# Patient Record
Sex: MALE | Race: WHITE | Employment: OTHER | URBAN - METROPOLITAN AREA
[De-identification: names, ages, dates, MRNs, and addresses within clinical notes are randomized per-mention and may not be internally consistent; named-entity substitution may affect disease eponyms.]

---

## 2021-03-31 ENCOUNTER — TRANSCRIBE ORDER (OUTPATIENT)
Dept: SCHEDULING | Age: 66
End: 2021-03-31

## 2021-03-31 DIAGNOSIS — R10.32 CHRONIC LLQ PAIN: Primary | ICD-10-CM

## 2021-03-31 DIAGNOSIS — G89.29 CHRONIC LLQ PAIN: Primary | ICD-10-CM

## 2021-04-07 ENCOUNTER — TRANSCRIBE ORDER (OUTPATIENT)
Dept: SCHEDULING | Age: 66
End: 2021-04-07

## 2021-04-07 DIAGNOSIS — R20.2 PARESTHESIA OF LEFT LEG: Primary | ICD-10-CM

## 2021-04-12 ENCOUNTER — HOSPITAL ENCOUNTER (OUTPATIENT)
Dept: VASCULAR SURGERY | Age: 66
Discharge: HOME | End: 2021-04-12
Attending: FAMILY MEDICINE
Payer: COMMERCIAL

## 2021-04-12 ENCOUNTER — HOSPITAL ENCOUNTER (OUTPATIENT)
Dept: CT IMAGING | Age: 66
Discharge: HOME | End: 2021-04-12
Attending: INTERNAL MEDICINE
Payer: COMMERCIAL

## 2021-04-12 DIAGNOSIS — R20.2 PARESTHESIA OF LEFT LEG: ICD-10-CM

## 2021-04-12 DIAGNOSIS — G89.29 CHRONIC LLQ PAIN: ICD-10-CM

## 2021-04-12 DIAGNOSIS — R10.32 CHRONIC LLQ PAIN: ICD-10-CM

## 2021-04-12 PROCEDURE — 74177 CT ABD & PELVIS W/CONTRAST: CPT

## 2021-04-12 PROCEDURE — 74011000250 HC RX REV CODE- 250: Performed by: INTERNAL MEDICINE

## 2021-04-12 PROCEDURE — 93922 UPR/L XTREMITY ART 2 LEVELS: CPT

## 2021-04-12 PROCEDURE — 74011000636 HC RX REV CODE- 636: Performed by: INTERNAL MEDICINE

## 2021-04-12 RX ORDER — BARIUM SULFATE 20 MG/ML
900 SUSPENSION ORAL
Status: COMPLETED | OUTPATIENT
Start: 2021-04-12 | End: 2021-04-12

## 2021-04-12 RX ADMIN — BARIUM SULFATE 900 ML: 21 SUSPENSION ORAL at 06:43

## 2021-04-12 RX ADMIN — IOPAMIDOL 100 ML: 755 INJECTION, SOLUTION INTRAVENOUS at 06:43

## 2021-04-13 LAB
LEFT ABI: 1.2
LEFT ANTERIOR TIBIAL: 175 MMHG
LEFT ARM BP: 143 MMHG
LEFT ATA BP LEVEL: NORMAL
LEFT POSTERIOR TIBIAL: 177 MMHG
LEFT TBI: 0.82
LEFT TOE PRESSURE: 122 MMHG
RIGHT ABI: 1.22
RIGHT ANTERIOR TIBIAL: 171 MMHG
RIGHT ARM BP: 148 MMHG
RIGHT ATA BP LEVEL: NORMAL
RIGHT POSTERIOR TIBIAL: 181 MMHG
RIGHT TBI: 0.64
RIGHT TOE PRESSURE: 95 MMHG

## 2021-09-07 ENCOUNTER — TRANSCRIBE ORDER (OUTPATIENT)
Dept: SCHEDULING | Age: 66
End: 2021-09-07

## 2021-09-07 DIAGNOSIS — M51.36 DDD (DEGENERATIVE DISC DISEASE), LUMBAR: ICD-10-CM

## 2021-09-07 DIAGNOSIS — M54.16 LUMBAR RADICULOPATHY: ICD-10-CM

## 2021-09-07 DIAGNOSIS — M47.817 LUMBOSACRAL SPONDYLOSIS WITHOUT MYELOPATHY: Primary | ICD-10-CM

## 2021-09-07 DIAGNOSIS — M21.372 LEFT FOOT DROP: ICD-10-CM

## 2021-09-07 DIAGNOSIS — M54.32 LEFT SIDED SCIATICA: ICD-10-CM

## 2021-09-07 DIAGNOSIS — M96.1 POST LAMINECTOMY SYNDROME: ICD-10-CM

## 2021-09-08 ENCOUNTER — OFFICE VISIT (OUTPATIENT)
Dept: NEUROLOGY | Age: 66
End: 2021-09-08
Payer: COMMERCIAL

## 2021-09-08 VITALS
DIASTOLIC BLOOD PRESSURE: 80 MMHG | HEART RATE: 67 BPM | OXYGEN SATURATION: 98 % | TEMPERATURE: 98.5 F | WEIGHT: 171.8 LBS | HEIGHT: 66 IN | SYSTOLIC BLOOD PRESSURE: 147 MMHG | RESPIRATION RATE: 17 BRPM | BODY MASS INDEX: 27.61 KG/M2

## 2021-09-08 DIAGNOSIS — G31.84 MCI (MILD COGNITIVE IMPAIRMENT) WITH MEMORY LOSS: Primary | ICD-10-CM

## 2021-09-08 DIAGNOSIS — Z87.820 PERSONAL HISTORY OF MULTIPLE CONCUSSIONS: ICD-10-CM

## 2021-09-08 DIAGNOSIS — E53.8 VITAMIN B12 DEFICIENCY: ICD-10-CM

## 2021-09-08 DIAGNOSIS — R41.3 MEMORY LOSS: ICD-10-CM

## 2021-09-08 PROBLEM — M51.36 LUMBAR DEGENERATIVE DISC DISEASE: Status: ACTIVE | Noted: 2021-09-08

## 2021-09-08 PROBLEM — M47.27 OSTEOARTHRITIS OF SPINE WITH RADICULOPATHY, LUMBOSACRAL REGION: Status: ACTIVE | Noted: 2021-09-08

## 2021-09-08 PROBLEM — M96.1 POSTLAMINECTOMY SYNDROME OF LUMBAR REGION: Status: ACTIVE | Noted: 2021-09-08

## 2021-09-08 PROBLEM — M21.372 ACQUIRED LEFT FOOT DROP: Status: ACTIVE | Noted: 2021-09-08

## 2021-09-08 LAB — TSH SERPL DL<=0.05 MIU/L-ACNC: 1.55 UIU/ML (ref 0.36–3.74)

## 2021-09-08 PROCEDURE — 99205 OFFICE O/P NEW HI 60 MIN: CPT | Performed by: PSYCHIATRY & NEUROLOGY

## 2021-09-08 RX ORDER — DONEPEZIL HYDROCHLORIDE 5 MG/1
5 TABLET, FILM COATED ORAL
Qty: 30 TABLET | Refills: 0 | Status: SHIPPED | OUTPATIENT
Start: 2021-09-08 | End: 2021-09-24 | Stop reason: DRUGHIGH

## 2021-09-08 NOTE — PROGRESS NOTES
Identified pt with two pt identifiers(name and ). Reviewed record in preparation for visit and have obtained necessary documentation. Chief Complaint   Patient presents with    New Patient   [facility] Establish Care   Patient referred by primary care- patient has had memory issues for about 3 years, worse in the last year. Patient reported spin surgery twice. Health Maintenance Due   Topic    Hepatitis C Screening     DTaP/Tdap/Td series (1 - Tdap)    Lipid Screen     Colorectal Cancer Screening Combo     Shingrix Vaccine Age 50> (1 of 2)    Pneumococcal 65+ years (1 of 1 - PPSV23)    Flu Vaccine (1)        Visit Vitals  BP (!) 147/80 (BP 1 Location: Left upper arm, BP Patient Position: Sitting)   Pulse 67   Temp 98.5 °F (36.9 °C) (Oral)   Resp 17   Ht 5' 5.5\" (1.664 m)   Wt 171 lb 12.8 oz (77.9 kg)   SpO2 98%   BMI 28.15 kg/m²     Pain Scale: /10    Coordination of Care Questionnaire:  :   1. Have you been to the ER, urgent care clinic since your last visit? Hospitalized since your last visit? No    2. Have you seen or consulted any other health care providers outside of the [address] [place] [address] since your last visit? Include any pap smears or colon screening.  No'

## 2021-09-08 NOTE — PATIENT INSTRUCTIONS
As per discussion    Your diagnosis is early onset early stage dementia probably Alzheimer's  Your concussions have probably magnified the issue but you probably have this because of genetics    I will start you on something called Aricept 5 mg take it once a day does not matter when you take it I usually recommend taking it with a meal there were 30 tablets on this with 0 refills once you are down to the last couple days please notify my office and I will send in a 10 mg prescription    I have ordered an MRI scan to rule out any other issues that could be contributing to your symptoms  Central scheduling should be calling you to set up the test that have been ordered. If you do not hear from them you can reach out to them at [phone] to get that completed. I am also ordering some additional blood work to see if there are any other what I called magnifiers to your condition that you are potentially treatable    I strongly encourage you to use my chart if you need to contact us. Presently with a high utilization of telehealth it is very difficult to get through on her phone lines. If you have not already activated my chart or you forget your password their instructions in this packet to get my chart activated. Office Policies    o Phone calls/patient messages:  Please allow up to 24 hours for someone in the office to contact you about your call or message. Be mindful your provider may be out of the office or your message may require further review. We encourage you to use [employer] for your messages as this is a faster, more efficient way to communicate with our office    o Medication Refills:  Prescription medications require up to 48 business hours to process. We encourage you to use [employer] for your refills. For controlled medications: Please allow up to 72 business hours to process. Certain medications may require you to  a written prescription at our office.     NO narcotic/controlled medications will be prescribed after 4pm Monday through Friday or on weekends    o Form/Paperwork Completion:  We ask that you allow 7-14 business days. You may also download your forms to [employer] to have your doctor print off.
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
## 2021-09-08 NOTE — ASSESSMENT & PLAN NOTE
Recent neuropsych testing supportive of advanced mild cognitive impairment versus early dementia  Patient has not had a scan image of his brain we need to take a look at that to make sure there is no other contributory factors    However at this point patient most probably has early onset early stage dementia given the progressive nature    We'll start Aricept 5 mg daily and increase to 10 mg Common and serious side effects were discussed at a later point time we will add in Namenda    We will also check B12 level to make sure that is not a magnifying contributing factor    History of concussions certainly can magnify the issue but I would expect those issues to be stagnant and this is progressive so I do truly think we are dealing with a degenerative aggressive process such as Alzheimer's dementia    We did briefly touch on the infusible therapy recently improved we are still waiting on protocol development but once we have that in place we can discuss this in more detail and see if he would be interested and if this is an appropriate treatment for him

## 2021-09-20 ENCOUNTER — HOSPITAL ENCOUNTER (OUTPATIENT)
Dept: MRI IMAGING | Age: 66
Discharge: HOME | End: 2021-09-20
Payer: COMMERCIAL

## 2021-09-20 DIAGNOSIS — G31.84 MCI (MILD COGNITIVE IMPAIRMENT) WITH MEMORY LOSS: ICD-10-CM

## 2021-09-20 DIAGNOSIS — R41.3 MEMORY LOSS: ICD-10-CM

## 2021-09-20 DIAGNOSIS — Z87.820 PERSONAL HISTORY OF MULTIPLE CONCUSSIONS: ICD-10-CM

## 2021-09-20 PROCEDURE — 70551 MRI BRAIN STEM W/O DYE: CPT

## 2021-09-21 ENCOUNTER — TELEPHONE (OUTPATIENT)
Dept: NEUROLOGY | Age: 66
End: 2021-09-21

## 2021-09-21 NOTE — PROGRESS NOTES
Left an outpatient my chart my name of the results of his MRI scan did not show any acute findings but is consistent with this diagnosis of severe MCI/early dementia

## 2021-09-21 NOTE — TELEPHONE ENCOUNTER
Patients wife yumiko watts  would like a call back to discuss his mri and lab results.   Thuan Herndon is on the phi form, [phone]

## 2021-09-22 NOTE — TELEPHONE ENCOUNTER
Please tell his wife that the MRI results are available in my chart she should be able to see them along with the note that I sent them reviewing the results. The note was as follows: Your MRI scan does not show any acute problems or issues. It is supportive of changes that would be consistent with memory loss. This supports your neuropsych evaluation suggesting that you do indeed have significant memory loss/early dementia as we had previously discussed in the office.   You have any further concerns or issues we can further discuss them at your follow-up visit    All lab work was unremarkable    If she has any further questions or concerns we can discuss them at the follow-up visit    I am not sure why we bother to make note in my chart if families and patients do not read them

## 2021-09-22 NOTE — TELEPHONE ENCOUNTER
Please let me know the results of the labs and I can contact them.  I see the MRI result note in the chart

## 2021-09-24 ENCOUNTER — TELEPHONE (OUTPATIENT)
Dept: NEUROLOGY | Age: 66
End: 2021-09-24

## 2021-09-24 DIAGNOSIS — G31.84 MCI (MILD COGNITIVE IMPAIRMENT) WITH MEMORY LOSS: Primary | ICD-10-CM

## 2021-09-24 RX ORDER — DONEPEZIL HYDROCHLORIDE 10 MG/1
10 TABLET, FILM COATED ORAL
Qty: 90 TABLET | Refills: 1 | Status: SHIPPED | OUTPATIENT
Start: 2021-09-24 | End: 2022-03-22

## 2021-09-24 NOTE — TELEPHONE ENCOUNTER
The patient's wife notified me that the patient will be out of the 5mg Aricept in the next 1-2 weeks. She was told when they got close to call the office because Asha Dailysingle was going to send in 10mg at the time.     Please send in if warranted

## 2021-09-24 NOTE — TELEPHONE ENCOUNTER
I called the patient's wife.  She saw the report but not the notes, but thinks she may have read it before the note put in  I gave her the information all below

## 2022-03-18 PROBLEM — R41.3 MEMORY LOSS: Status: ACTIVE | Noted: 2021-09-08

## 2022-03-18 PROBLEM — M96.1 POSTLAMINECTOMY SYNDROME OF LUMBAR REGION: Status: ACTIVE | Noted: 2021-09-08

## 2022-03-18 PROBLEM — M47.27 OSTEOARTHRITIS OF SPINE WITH RADICULOPATHY, LUMBOSACRAL REGION: Status: ACTIVE | Noted: 2021-09-08

## 2022-03-18 PROBLEM — G31.84 MCI (MILD COGNITIVE IMPAIRMENT) WITH MEMORY LOSS: Status: ACTIVE | Noted: 2021-09-08

## 2022-03-19 PROBLEM — M51.369 LUMBAR DEGENERATIVE DISC DISEASE: Status: ACTIVE | Noted: 2021-09-08

## 2022-03-19 PROBLEM — M51.36 LUMBAR DEGENERATIVE DISC DISEASE: Status: ACTIVE | Noted: 2021-09-08

## 2022-03-19 PROBLEM — M21.372 ACQUIRED LEFT FOOT DROP: Status: ACTIVE | Noted: 2021-09-08

## 2022-03-20 PROBLEM — Z87.820 PERSONAL HISTORY OF MULTIPLE CONCUSSIONS: Status: ACTIVE | Noted: 2021-09-08

## 2022-03-22 DIAGNOSIS — G31.84 MCI (MILD COGNITIVE IMPAIRMENT) WITH MEMORY LOSS: ICD-10-CM

## 2022-03-22 RX ORDER — DONEPEZIL HYDROCHLORIDE 10 MG/1
TABLET, FILM COATED ORAL
Qty: 90 TABLET | Refills: 1 | Status: SHIPPED | OUTPATIENT
Start: 2022-03-22 | End: 2022-03-25 | Stop reason: SINTOL

## 2022-03-25 ENCOUNTER — OFFICE VISIT (OUTPATIENT)
Dept: NEUROLOGY | Age: 67
End: 2022-03-25
Payer: COMMERCIAL

## 2022-03-25 VITALS
HEART RATE: 72 BPM | BODY MASS INDEX: 29.5 KG/M2 | RESPIRATION RATE: 18 BRPM | DIASTOLIC BLOOD PRESSURE: 80 MMHG | TEMPERATURE: 97.7 F | SYSTOLIC BLOOD PRESSURE: 142 MMHG | WEIGHT: 180 LBS | OXYGEN SATURATION: 96 %

## 2022-03-25 DIAGNOSIS — F02.80 EARLY ONSET ALZHEIMER'S DEMENTIA WITHOUT BEHAVIORAL DISTURBANCE (HCC): Primary | ICD-10-CM

## 2022-03-25 DIAGNOSIS — T88.7XXA SIDE EFFECT OF MEDICATION: ICD-10-CM

## 2022-03-25 DIAGNOSIS — G30.0 EARLY ONSET ALZHEIMER'S DEMENTIA WITHOUT BEHAVIORAL DISTURBANCE (HCC): Primary | ICD-10-CM

## 2022-03-25 DIAGNOSIS — R45.1 AGITATION: ICD-10-CM

## 2022-03-25 PROCEDURE — 99215 OFFICE O/P EST HI 40 MIN: CPT | Performed by: PSYCHIATRY & NEUROLOGY

## 2022-03-25 RX ORDER — MEMANTINE HYDROCHLORIDE 10 MG/1
TABLET ORAL
Qty: 180 TABLET | Refills: 3 | Status: SHIPPED | OUTPATIENT
Start: 2022-03-25

## 2022-03-25 NOTE — PATIENT INSTRUCTIONS
As per our discussion:  Stop the Aricept once your dream state goes back to baseline then start the Namenda:  Namenda: start 1/2 tab 2x/day for 2 weeks then increase to 1 full tab 2 times per day     I strongly recommend that you contact the local Alzheimer's Society they are wonderful regarding education and support and can help you navigate through this diagnosis over time  [address] [facility] [place][note 2] phone number: [phone]    I also recommend going to the [employer] but please remember this is a comprehensive website and is written for a worldwide audience so not everything on their will apply to you    I recommend the book for 36-hour day by Eloise Leija. This is a very comprehensive easy-to-read reference related to Alzheimer's dementia and it is written for the caregiver.   It is fully comprehensive everything in this book may not apply to your particular case

## 2022-03-25 NOTE — ASSESSMENT & PLAN NOTE
Discontinue Aricept secondary to side effects of nightmares    Once his dream state goes back to baseline I want him to start Namenda 10 mg titrating to twice daily [this is been outlined in the AVS]    Strongly encouraged the patient and his wife to contact the Alzheimer's Society and touch base with the  to help him navigate some of these more complex issues

## 2022-03-25 NOTE — LETTER
3/25/2022    Patient: Camille Sierra   YOB: 1955   Date of Visit: 3/25/2022     Emmie Alvarez MD  [address] [place] [address]  [address] [phone]  Via Fax: [phone]    Dear Emmie Alvarez MD,      Thank you for referring Mr. Britni Tavarez to [address] [place] [address] for evaluation. My notes for this consultation are attached. If you have questions, please do not hesitate to call me. I look forward to following your patient along with you.       Sincerely,    Jf Jaffe NP
Benefits, risks, and possible complications of procedure explained to patient/caregiver who verbalized understanding and gave verbal consent.

## 2022-03-25 NOTE — ASSESSMENT & PLAN NOTE
Having nightmares to Aricept  Discontinue medication    Wife unclear whether there may also be some issues related to anxiety and depression contributing to this    We will see how things play out after discontinuing the Aricept

## 2022-03-25 NOTE — ASSESSMENT & PLAN NOTE
Multifactorial in nature due to significant lifestyle changes and significant personal loss  Along with frustration related to underlying diagnosis and difficulty in completing routine tasks and not being able to maintain employment    Strongly encourage them to reach out to the Alzheimer's Society  who can start helping them navigate some these more difficult aspects and strongly recommended getting some counseling
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
## 2022-05-17 RX ORDER — DONEPEZIL HYDROCHLORIDE 10 MG/1
10 TABLET, FILM COATED ORAL DAILY
Qty: 90 TABLET | Refills: 3 | Status: SHIPPED | OUTPATIENT
Start: 2022-05-17 | End: 2022-06-16

## 2022-05-17 NOTE — TELEPHONE ENCOUNTER
Hi Dr. Jefry Moody started back on the Donepezil last week every other day for 2 weeks as directed then taking one tablet daily - in addition to the [address] He only has enough of the Donepezil to last through this week. Could you please call in a new RX for this as the bottle he's about to finish up doesn't have any refills on it.   Thank you,     Last office visit 3/25/2022  Last med refill historical

## 2022-09-29 ENCOUNTER — OFFICE VISIT (OUTPATIENT)
Dept: NEUROLOGY | Age: 67
End: 2022-09-29
Payer: COMMERCIAL

## 2022-09-29 VITALS
TEMPERATURE: 97.4 F | HEIGHT: 65 IN | DIASTOLIC BLOOD PRESSURE: 70 MMHG | OXYGEN SATURATION: 98 % | HEART RATE: 76 BPM | WEIGHT: 177 LBS | RESPIRATION RATE: 16 BRPM | BODY MASS INDEX: 29.49 KG/M2 | SYSTOLIC BLOOD PRESSURE: 120 MMHG

## 2022-09-29 DIAGNOSIS — F41.9 ANXIETY: ICD-10-CM

## 2022-09-29 DIAGNOSIS — R45.1 AGITATION: ICD-10-CM

## 2022-09-29 DIAGNOSIS — Z63.6 CAREGIVER BURDEN: ICD-10-CM

## 2022-09-29 DIAGNOSIS — T88.7XXA SIDE EFFECT OF MEDICATION: ICD-10-CM

## 2022-09-29 DIAGNOSIS — F02.80 EARLY ONSET ALZHEIMER'S DEMENTIA WITHOUT BEHAVIORAL DISTURBANCE (HCC): Primary | ICD-10-CM

## 2022-09-29 DIAGNOSIS — G30.0 EARLY ONSET ALZHEIMER'S DEMENTIA WITHOUT BEHAVIORAL DISTURBANCE (HCC): Primary | ICD-10-CM

## 2022-09-29 DIAGNOSIS — H90.3 SENSORINEURAL HEARING LOSS (SNHL) OF BOTH EARS: ICD-10-CM

## 2022-09-29 PROCEDURE — 1123F ACP DISCUSS/DSCN MKR DOCD: CPT | Performed by: PSYCHIATRY & NEUROLOGY

## 2022-09-29 PROCEDURE — 99215 OFFICE O/P EST HI 40 MIN: CPT | Performed by: PSYCHIATRY & NEUROLOGY

## 2022-09-29 RX ORDER — PREGABALIN 150 MG/1
CAPSULE ORAL
COMMUNITY
Start: 2022-09-19

## 2022-09-29 RX ORDER — ESCITALOPRAM OXALATE 10 MG/1
10 TABLET ORAL DAILY
Qty: 90 TABLET | Refills: 1 | Status: SHIPPED | OUTPATIENT
Start: 2022-09-29

## 2022-09-29 RX ORDER — DONEPEZIL HYDROCHLORIDE 10 MG/1
10 TABLET, FILM COATED ORAL DAILY
COMMUNITY
Start: 2022-08-06

## 2022-09-29 SDOH — SOCIAL STABILITY - SOCIAL INSECURITY: DEPENDENT RELATIVE NEEDING CARE AT HOME: Z63.6

## 2022-09-29 NOTE — ASSESSMENT & PLAN NOTE
Probably contributory to patient's reduced processing abilities at least magnifies these issues    Have recommended the patient wear his hearing aids when in the house or engaged in conversation with people    Certainly when he is out in the barn and doing yard work if he does not wish to wear them that is a reasonable compromise between but the patient wants and what the patient thinks his wife wants Mohan Briceño may not necessarily be true]

## 2022-09-29 NOTE — LETTER
9/29/2022    Patient: Braydon Cheung   YOB: 1955   Date of Visit: 9/29/2022     Vicky Mcnamara MD  [id number]  [address] [address]  Via Fax: [phone]    Dear Vicky Mcnamara MD,      Thank you for referring Mr. Branden Cummins to [address] [place] [address] for evaluation. My notes for this consultation are attached. If you have questions, please do not hesitate to call me. I look forward to following your patient along with you.       Sincerely,    Glenna Corral NP

## 2022-09-29 NOTE — PROGRESS NOTES
1. Have you been to the ER, urgent care clinic since your last visit? NO Hospitalized since your last visit? NO    2. Have you seen or consulted any other health care providers outside of the [address] [place] [address] since your last visit? No Include any pap smears or colon screening.  NO

## 2022-09-29 NOTE — PATIENT INSTRUCTIONS
As per discussion    I do think you need to wear your hearing aids whenever you are in the house and around other people if you are working in the barn that is fine you do not need to wear them    I have started you on a medication called Lexapro to help down regulate some of your anxiety and agitation which is not uncommon with a diagnosis of dementia    This is a long-haul process for sure. You need to try to enlist help of family and friends Mela Bunch also rely on community resources      Below is a list of resources to assist you with addressing some of the long-term issues that we need to worry about regarding supervision within the home    Resources are listed below:  [employer]   [employer]   phone number [phone] [facility] care Specialist April [address] [place] [phone]    [employer]       I strongly recommend that you contact the local Alzheimer's Society they are wonderful regarding education and support and can help you navigate through this diagnosis over time  [id number] [place] [address] phone number: [phone]        Office Policies      Appointments  Please make sure that you arrive for your next appointment at least 15 minutes prior to your appointment time. If for some reason you are going to be late please notify the office to determine if you need to be rescheduled or we can adjust your appointment time      Phone calls/patient messages:  Please allow up to 24 hours for someone in the office to contact you about your call or message. Be mindful your provider may be out of the office or your message may require further review. We encourage you to use [employer] for your messages as this is a faster, more efficient way to communicate with our office    Medication Refills:  Prescription medications require up to 48 business hours to process.  We encourage you to use [employer] for your refills. For controlled medications: Please allow up to 72 business hours to process. Certain medications may require you to  a written prescription at our office. NO narcotic/controlled medications will be prescribed after 4pm Monday through Friday or on weekends    Form/Paperwork Completion:  We ask that you allow 7-14 business days. You may also download your forms to [employer] to have your doctor print off. I also recommend going to the [employer] but please remember this is a comprehensive website and is written for a worldwide audience so not everything on their will apply to you    I recommend the book for 36-hour day by Katie Oliveros. This is a very comprehensive easy-to-read reference related to Alzheimer's dementia and it is written for the caregiver.   It is fully comprehensive everything in this book may not apply to your particular case

## 2022-09-29 NOTE — ASSESSMENT & PLAN NOTE
Multifactorial in nature to include cognitive deficit hard of hearing  We will add Lexapro 10 mg daily  Encourage patient to wear his hearing aids

## 2022-09-29 NOTE — ASSESSMENT & PLAN NOTE
Patient's wife expresses concerns about being overwhelmed with minimal assistance from family and friends along with patient directed agitation toward her which is not uncommon with dementia unfortunately    I have directed the patient's wife to engage with the Alzheimer's Society and support groups and with her  to help address some of these issues and to give her better coping strategies in addition I have also given her some community resources which may help reduce caregiver burden

## 2022-12-30 DIAGNOSIS — F41.9 ANXIETY: ICD-10-CM

## 2023-01-02 RX ORDER — ESCITALOPRAM OXALATE 10 MG/1
10 TABLET ORAL DAILY
Qty: 90 TABLET | Refills: 1 | Status: SHIPPED | OUTPATIENT
Start: 2023-01-02

## 2023-03-30 ENCOUNTER — OFFICE VISIT (OUTPATIENT)
Dept: NEUROLOGY | Age: 68
End: 2023-03-30
Payer: COMMERCIAL

## 2023-03-30 VITALS
BODY MASS INDEX: 30.69 KG/M2 | RESPIRATION RATE: 16 BRPM | HEART RATE: 59 BPM | WEIGHT: 184.2 LBS | HEIGHT: 65 IN | SYSTOLIC BLOOD PRESSURE: 138 MMHG | TEMPERATURE: 97.6 F | DIASTOLIC BLOOD PRESSURE: 76 MMHG | OXYGEN SATURATION: 97 %

## 2023-03-30 DIAGNOSIS — G30.0 EARLY ONSET ALZHEIMER'S DEMENTIA WITHOUT BEHAVIORAL DISTURBANCE (HCC): Primary | ICD-10-CM

## 2023-03-30 DIAGNOSIS — M21.372 ACQUIRED LEFT FOOT DROP: ICD-10-CM

## 2023-03-30 DIAGNOSIS — F02.80 EARLY ONSET ALZHEIMER'S DEMENTIA WITHOUT BEHAVIORAL DISTURBANCE (HCC): Primary | ICD-10-CM

## 2023-03-30 DIAGNOSIS — H90.3 SENSORINEURAL HEARING LOSS (SNHL) OF BOTH EARS: ICD-10-CM

## 2023-03-30 DIAGNOSIS — F41.9 ANXIETY: ICD-10-CM

## 2023-03-30 PROCEDURE — 99214 OFFICE O/P EST MOD 30 MIN: CPT | Performed by: PSYCHIATRY & NEUROLOGY

## 2023-03-30 PROCEDURE — 1123F ACP DISCUSS/DSCN MKR DOCD: CPT | Performed by: PSYCHIATRY & NEUROLOGY

## 2023-03-30 RX ORDER — MEMANTINE HYDROCHLORIDE 10 MG/1
TABLET ORAL
Qty: 180 TABLET | Refills: 3 | Status: SHIPPED | OUTPATIENT
Start: 2023-03-30

## 2023-03-30 NOTE — ASSESSMENT & PLAN NOTE
Generally wears hearing aids but certainly contributory to patient's processing ability related to his cognitive issues at the very least certainly magnifies this    He does not always wear his hearing aids in the past and has been encouraged to make sure he is wearing them all the time

## 2023-03-30 NOTE — PATIENT INSTRUCTIONS
As per discussion overall you continue to do well    I have renewed the memantine also known as Namenda with a 90-day supply and refills times a year your other 2 medications are not due at this time  If you need a new prescription before we see you back please ask your pharmacy to send us an electronic renewal that is the most efficient method of medication renewal     Continue activity as tolerated      Office Policies      Appointments  Please make sure that you arrive for your next appointment at least 15 minutes prior to your appointment time. If for some reason you are going to be late please notify the office to determine if you need to be rescheduled or we can adjust your appointment time      Phone calls/patient messages:  Please allow up to 24 hours for someone in the office to contact you about your call or message. Be mindful your provider may be out of the office or your message may require further review. We encourage you to use [employer] for your messages as this is a faster, more efficient way to communicate with our office    Medication Refills:  Prescription medications require up to 48 business hours to process. We encourage you to use [employer] for your refills. For controlled medications: Please allow up to 72 business hours to process. Certain medications may require you to  a written prescription at our office. NO narcotic/controlled medications will be prescribed after 4pm Monday through Friday or on weekends    Form/Paperwork Completion:  We ask that you allow 7-14 business days. You may also download your forms to [employer] to have your doctor print off.

## 2023-03-30 NOTE — ASSESSMENT & PLAN NOTE
Multifactorial nature to include cognitive deficits and hearing loss    Much improved on Lexapro 10 mg daily continue on this presently and patient has been encouraged to continue to wear his hearing aids
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
## 2023-03-30 NOTE — PROGRESS NOTES
Chief Complaint   Patient presents with    Follow-up     Routine follow up states that legs do what they want to do.

## 2023-03-30 NOTE — LETTER
3/30/2023    Patient: Demarco Dillon   YOB: 1955   Date of Visit: 3/30/2023     Konstantin Justice MD  [address] [place] [address]  [address] [phone]  Via Fax: [phone]    Dear Konstantin Justice MD,      Thank you for referring Mr. Kristin Arias to [address] [place] [address] for evaluation. My notes for this consultation are attached. If you have questions, please do not hesitate to call me. I look forward to following your patient along with you.       Sincerely,    Horacio Joya, ANP-C

## 2023-03-30 NOTE — ASSESSMENT & PLAN NOTE
Continue Aricept 10 mg in the a.m.  [at night it causes nightmares] and continue Namenda 10 mg twice daily    Patient's been encouraged to continue to remain active exercise and eat a healthy diet to promote brain health

## 2023-05-30 RX ORDER — DONEPEZIL HYDROCHLORIDE 10 MG/1
TABLET, FILM COATED ORAL
Qty: 90 TABLET | Refills: 3 | Status: SHIPPED | OUTPATIENT
Start: 2023-05-30

## 2023-10-05 ENCOUNTER — OFFICE VISIT (OUTPATIENT)
Age: 68
End: 2023-10-05
Payer: COMMERCIAL

## 2023-10-05 VITALS
BODY MASS INDEX: 30.12 KG/M2 | SYSTOLIC BLOOD PRESSURE: 124 MMHG | HEIGHT: 65 IN | HEART RATE: 62 BPM | OXYGEN SATURATION: 96 % | WEIGHT: 180.8 LBS | TEMPERATURE: 98.2 F | DIASTOLIC BLOOD PRESSURE: 78 MMHG | RESPIRATION RATE: 16 BRPM

## 2023-10-05 DIAGNOSIS — H90.3 SENSORINEURAL HEARING LOSS (SNHL) OF BOTH EARS: ICD-10-CM

## 2023-10-05 DIAGNOSIS — F03.911 AGITATION DUE TO DEMENTIA (HCC): ICD-10-CM

## 2023-10-05 DIAGNOSIS — M21.372 ACQUIRED LEFT FOOT DROP: ICD-10-CM

## 2023-10-05 DIAGNOSIS — G30.0 ALZHEIMER'S DISEASE WITH EARLY ONSET (CODE) (HCC): Primary | ICD-10-CM

## 2023-10-05 PROCEDURE — 99215 OFFICE O/P EST HI 40 MIN: CPT | Performed by: PSYCHIATRY & NEUROLOGY

## 2023-10-05 PROCEDURE — 1123F ACP DISCUSS/DSCN MKR DOCD: CPT | Performed by: PSYCHIATRY & NEUROLOGY

## 2023-10-05 RX ORDER — PREGABALIN 200 MG/1
CAPSULE ORAL
COMMUNITY
Start: 2023-09-19

## 2023-10-05 RX ORDER — MEMANTINE HYDROCHLORIDE 10 MG/1
TABLET ORAL
Qty: 180 TABLET | Refills: 3 | Status: SHIPPED | OUTPATIENT
Start: 2023-10-05

## 2023-10-05 RX ORDER — ESCITALOPRAM OXALATE 20 MG/1
20 TABLET ORAL DAILY
Qty: 90 TABLET | Refills: 3 | Status: SHIPPED | OUTPATIENT
Start: 2023-10-05

## 2023-10-05 NOTE — PROGRESS NOTES
[address]  In Office follow-up pt VISIT         Michael Carmona is a 80 y.o. male who presents today for the following:  Chief Complaint   Patient presents with    6 Month Follow-Up     Alzheimer's disease         ASSESSMENT AND PLAN  1. Alzheimer's disease with early onset (CODE) [facility])  Assessment & Plan:   Patient with decline in functioning and cognitive process  Repeat neuropsych testing with Dr. Michell Villatoro last neuropsych testing was 2021 with Dr. Taryn Silva    PET amyloid testing ordered as well this patient very much could be a candidate for our new or infusible treatments especially given his age we would want to consider more aggressive therapy    Driving eval has [facility]    Continue on Aricept 10 mg/day and Namenda 10 mg twice daily he takes the Aricept in the morning because when he took it at night it caused nightmares  Orders:  -     PET BRAIN AMYLOID IMAGING; Future  -     [facility] Juan F, Neuropsychology, [place]  -     Amb External Referral To Occupational Therapy  -     memantine (NAMENDA) 10 MG tablet; 1 tab BID, Disp-180 tablet, R-3Normal  2. 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 Sensorineural hearing loss (SNHL) of both ears  Assessment & Plan:   Generally wears hearing aids but certainly can magnify patient's cognitive issues as he does not wear this all the time  4. Acquired left foot drop  Assessment & Plan:   Followed by orthopedics on a long-term basis uses Lyrica for pain management prescribed outside of our practice    No falls or injuries to date        Patient and/or family was given time to ask questions and voice concerns. I believe all questions concerns were adequately addressed at this  office visit.   Patient

## 2023-10-05 NOTE — ASSESSMENT & PLAN NOTE
Generally wears hearing aids but certainly can magnify patient's cognitive issues as he does not wear this all the time

## 2023-10-05 NOTE — ASSESSMENT & PLAN NOTE
Patient with decline in functioning and cognitive process  Repeat neuropsych testing with Dr. Delia Hwang last neuropsych testing was 2021 with Dr. Riki Love    PET amyloid testing ordered as well this patient very much could be a candidate for our new or infusible treatments especially given his age we would want to consider more aggressive therapy    Driving eval has been [facility] through [facility]    Continue on Aricept 10 mg/day and Namenda 10 mg twice daily he takes the Aricept in the morning because when he took it at night it caused nightmares

## 2023-10-23 ENCOUNTER — APPOINTMENT (OUTPATIENT)
Facility: HOSPITAL | Age: 68
End: 2023-10-23
Payer: COMMERCIAL

## 2023-10-23 ENCOUNTER — HOSPITAL ENCOUNTER (EMERGENCY)
Facility: HOSPITAL | Age: 68
Discharge: HOME | End: 2023-10-23
Attending: EMERGENCY MEDICINE
Payer: COMMERCIAL

## 2023-10-23 VITALS
BODY MASS INDEX: 28.24 KG/M2 | DIASTOLIC BLOOD PRESSURE: 74 MMHG | HEIGHT: 66 IN | SYSTOLIC BLOOD PRESSURE: 158 MMHG | TEMPERATURE: 97.7 F | HEART RATE: 65 BPM | RESPIRATION RATE: 18 BRPM | OXYGEN SATURATION: 97 % | WEIGHT: 175.71 LBS

## 2023-10-23 DIAGNOSIS — S39.012A STRAIN OF LUMBAR REGION, INITIAL ENCOUNTER: Primary | ICD-10-CM

## 2023-10-23 PROCEDURE — 72100 X-RAY EXAM L-S SPINE 2/3 VWS: CPT

## 2023-10-23 PROCEDURE — 99283 EMERGENCY DEPT VISIT LOW MDM: CPT

## 2023-10-23 RX ORDER — METHOCARBAMOL 750 MG/1
750 TABLET, FILM COATED ORAL 4 TIMES DAILY
Qty: 40 TABLET | Refills: 0 | Status: SHIPPED | OUTPATIENT
Start: 2023-10-23 | End: 2023-11-02

## 2023-10-23 ASSESSMENT — PAIN SCALES - GENERAL: PAINLEVEL_OUTOF10: 8

## 2023-10-23 ASSESSMENT — PAIN - FUNCTIONAL ASSESSMENT: PAIN_FUNCTIONAL_ASSESSMENT: 0-10

## 2023-10-24 NOTE — ED NOTES
DC info reviewed with patient, all questions answered. Patient well-appearing at time of discharge and vital signs stable. Ambulatory out of ED at this time.        Sangita Guadalupe RN  10/23/23 [id number]

## 2023-10-29 DIAGNOSIS — F41.9 ANXIETY DISORDER, UNSPECIFIED: ICD-10-CM

## 2023-10-30 RX ORDER — ESCITALOPRAM OXALATE 10 MG/1
TABLET ORAL
Qty: 90 TABLET | Refills: 3 | OUTPATIENT
Start: 2023-10-30

## 2024-04-15 ENCOUNTER — OFFICE VISIT (OUTPATIENT)
Age: 69
End: 2024-04-15
Payer: COMMERCIAL

## 2024-04-15 VITALS
RESPIRATION RATE: 16 BRPM | HEART RATE: 70 BPM | OXYGEN SATURATION: 96 % | HEIGHT: 66 IN | DIASTOLIC BLOOD PRESSURE: 64 MMHG | WEIGHT: 184.4 LBS | SYSTOLIC BLOOD PRESSURE: 120 MMHG | BODY MASS INDEX: 29.63 KG/M2 | TEMPERATURE: 97.6 F

## 2024-04-15 DIAGNOSIS — G30.0 ALZHEIMER'S DISEASE WITH EARLY ONSET (CODE) (HCC): Primary | ICD-10-CM

## 2024-04-15 DIAGNOSIS — F03.911 AGITATION DUE TO DEMENTIA (HCC): ICD-10-CM

## 2024-04-15 PROCEDURE — 99215 OFFICE O/P EST HI 40 MIN: CPT | Performed by: PSYCHIATRY & NEUROLOGY

## 2024-04-15 PROCEDURE — G8417 CALC BMI ABV UP PARAM F/U: HCPCS | Performed by: PSYCHIATRY & NEUROLOGY

## 2024-04-15 PROCEDURE — 1123F ACP DISCUSS/DSCN MKR DOCD: CPT | Performed by: PSYCHIATRY & NEUROLOGY

## 2024-04-15 PROCEDURE — 3017F COLORECTAL CA SCREEN DOC REV: CPT | Performed by: PSYCHIATRY & NEUROLOGY

## 2024-04-15 PROCEDURE — G8427 DOCREV CUR MEDS BY ELIG CLIN: HCPCS | Performed by: PSYCHIATRY & NEUROLOGY

## 2024-04-15 PROCEDURE — 1036F TOBACCO NON-USER: CPT | Performed by: PSYCHIATRY & NEUROLOGY

## 2024-04-15 RX ORDER — BUSPIRONE HYDROCHLORIDE 5 MG/1
5 TABLET ORAL 2 TIMES DAILY
Qty: 60 TABLET | Refills: 5 | Status: SHIPPED | OUTPATIENT
Start: 2024-04-15 | End: 2024-10-12

## 2024-04-15 ASSESSMENT — PATIENT HEALTH QUESTIONNAIRE - PHQ9
SUM OF ALL RESPONSES TO PHQ QUESTIONS 1-9: 0
SUM OF ALL RESPONSES TO PHQ9 QUESTIONS 1 & 2: 0
SUM OF ALL RESPONSES TO PHQ QUESTIONS 1-9: 0
2. FEELING DOWN, DEPRESSED OR HOPELESS: NOT AT ALL
1. LITTLE INTEREST OR PLEASURE IN DOING THINGS: NOT AT ALL

## 2024-04-15 NOTE — PATIENT INSTRUCTIONS
As a reminder:   Please come to your appointment 15 minutes before your office appointment.  This way, you can get checked in at the  and checked in by the nursing staff so you have the full allotment of time with your provider for your visit.  Please bring an up-to-date and accurate list of all your medications.  Or bring all your active prescription bottles with you at the time of your office visit and this includes over-the-counter medications so we can make sure that your medication list is up-to-date.        As per discussion  Continue on your medications as it is and I am going to add in something called BuSpar 5 mg 1 tablet twice a day so breakfast and dinner is fine we can go all the way up to 30 mg/day if needed in divided doses but lets start on the low-dose and see how this does.  It does work synergistically with the Lexapro    You have an appointment with Dr. Leach to reassess where things are cognitively and I strongly suggest you get that completed I do think it is important to help us determine what our next steps are regarding the memory issues    I have recommended driving evaluation at [facility] I do think this is important given the baseline cognitive issues that you have.  I have ordered this previously and you did not feel like you needed to get this done and I have approached this again at today's office visit and I do think it is NOT in your best interest not to get this done however I cannot make you do it    We also talked about getting a PET scan done but will wait to see what happens with Dr. Golden's testing        Office Policies    Phone calls/patient messages:  Please allow up to 48 hours for someone in the office to contact you about your call or message. Be mindful your provider may be out of the office or your message may require further review. We encourage you to use [employer] for your messages as this is a faster, more efficient way to communicate with our

## 2024-04-15 NOTE — ASSESSMENT & PLAN NOTE
At last office visit Lexapro was increased to 20 mg/day   No real improvement was reported by either the patient or his wife  We will add BuSpar 5 mg twice daily and can titrate that as needed or tolerated over time depending on his response up to 30 mg daily but I like to start at the lowest dose possible at this time

## 2024-04-15 NOTE — ASSESSMENT & PLAN NOTE
Repeat neuropsych testing still pending scheduled for 5/30/2024  PET amyloid was not completed although ordered at last office visit; patient states he did not want to take it  Driving evaluation through [facility] OT was also ordered at last office visit: was not completed; and pt states he is not interested in getting this done; this is against my medical advise    Patient continues on Aricept 10 mg/day and Namenda 10 mg twice a day he is taking the Aricept in the morning because when he took it at night because nightmares

## 2024-04-15 NOTE — PROGRESS NOTES
[facility]  [facility]  [address]  [place] [address]  Phone: [phone] fax: [phone]          Srinivas Rodriguez is a 68 y.o. male who presents today for the following:  Chief Complaint   Patient presents with    Follow-up     Patient's wife states he has changed a bit but nothing earth shattering just as to be expected.          ASSESSMENT AND PLAN  1. Alzheimer's disease with early onset (CODE) ([facility])  Assessment & Plan:  Repeat neuropsych testing still pending scheduled for 5/30/2024  PET amyloid was not completed although ordered at last office visit; patient states he did not want to take it  Driving evaluation through [facility] was also ordered at last office visit: was not completed; and pt states he is not interested in getting this done; this is against my medical advise    Patient continues on Aricept 10 mg/day and Namenda 10 mg twice a day he is taking the Aricept in the morning because when he took it at night because nightmares   2. 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 ([facility])  Comments:  Suspect multifactorial in nature to include cognitive impairment, sensorineural hearing loss, chronic pain  Assessment & Plan:  At last office visit Lexapro was increased to 20 mg/day   No real improvement was reported by either the patient or his wife  We will add BuSpar 5 mg twice daily and can titrate that as needed or tolerated over time depending on his response up to 30 mg daily but I like to start at the lowest dose possible at this time  Orders:  -     busPIRone (BUSPAR) 5 MG tablet; Take 1 tablet by mouth 2 times daily, Disp-60 tablet, R-5Normal        Patient and/or family was given time to ask questions and voice concerns. I believe all questions concerns were adequately addressed at this  office visit.  Patient and/or family also verbalized agreement and understanding of the above-stated plan    Patient remains a complex patient secondary to

## 2024-05-08 DIAGNOSIS — F03.911 AGITATION DUE TO DEMENTIA (HCC): ICD-10-CM

## 2024-05-08 RX ORDER — BUSPIRONE HYDROCHLORIDE 5 MG/1
5 TABLET ORAL 2 TIMES DAILY
Qty: 180 TABLET | Refills: 3 | Status: SHIPPED | OUTPATIENT
Start: 2024-05-08

## 2024-05-30 ENCOUNTER — TELEPHONE (OUTPATIENT)
Age: 69
End: 2024-05-30

## 2024-05-30 NOTE — TELEPHONE ENCOUNTER
Patients wife Candelaria called to cancel today's apptmt with Dr. Golden for 12pm. She will call back to reschedule.

## 2024-06-04 ENCOUNTER — TELEPHONE (OUTPATIENT)
Age: 69
End: 2024-06-04

## 2024-07-03 ENCOUNTER — PATIENT MESSAGE (OUTPATIENT)
Age: 69
End: 2024-07-03

## 2024-07-03 NOTE — TELEPHONE ENCOUNTER
[facility] for [facility], ph # [phone] to [facility] fax us the 's eval results.  Gave fax # and asked to call us if any problems or questions..

## 2024-07-05 ENCOUNTER — APPOINTMENT (OUTPATIENT)
Facility: HOSPITAL | Age: 69
End: 2024-07-05
Payer: COMMERCIAL

## 2024-07-05 ENCOUNTER — HOSPITAL ENCOUNTER (EMERGENCY)
Facility: HOSPITAL | Age: 69
Discharge: HOME | End: 2024-07-05
Attending: EMERGENCY MEDICINE
Payer: COMMERCIAL

## 2024-07-05 VITALS
RESPIRATION RATE: 16 BRPM | DIASTOLIC BLOOD PRESSURE: 79 MMHG | HEART RATE: 65 BPM | SYSTOLIC BLOOD PRESSURE: 138 MMHG | OXYGEN SATURATION: 96 % | HEIGHT: 66 IN | TEMPERATURE: 97.4 F | BODY MASS INDEX: 28.93 KG/M2 | WEIGHT: 180 LBS

## 2024-07-05 DIAGNOSIS — S09.90XA CLOSED HEAD INJURY, INITIAL ENCOUNTER: ICD-10-CM

## 2024-07-05 DIAGNOSIS — T78.40XA ACUTE ALLERGIC REACTION, INITIAL ENCOUNTER: Primary | ICD-10-CM

## 2024-07-05 PROCEDURE — 6370000000 HC RX 637 (ALT 250 FOR IP): Performed by: EMERGENCY MEDICINE

## 2024-07-05 PROCEDURE — 99284 EMERGENCY DEPT VISIT MOD MDM: CPT

## 2024-07-05 PROCEDURE — 70450 CT HEAD/BRAIN W/O DYE: CPT

## 2024-07-05 PROCEDURE — 2580000003 HC RX 258: Performed by: EMERGENCY MEDICINE

## 2024-07-05 PROCEDURE — 96374 THER/PROPH/DIAG INJ IV PUSH: CPT

## 2024-07-05 PROCEDURE — 2500000003 HC RX 250 WO HCPCS: Performed by: EMERGENCY MEDICINE

## 2024-07-05 RX ORDER — HYDROXYZINE HYDROCHLORIDE 25 MG/1
25 TABLET, FILM COATED ORAL EVERY 6 HOURS PRN
Qty: 12 TABLET | Refills: 0 | Status: SHIPPED | OUTPATIENT
Start: 2024-07-05 | End: 2024-07-10

## 2024-07-05 RX ORDER — HYDROXYZINE HYDROCHLORIDE 25 MG/1
50 TABLET, FILM COATED ORAL
Status: COMPLETED | OUTPATIENT
Start: 2024-07-05 | End: 2024-07-05

## 2024-07-05 RX ORDER — PREDNISONE 10 MG/1
30 TABLET ORAL DAILY
Qty: 9 TABLET | Refills: 0 | Status: SHIPPED | OUTPATIENT
Start: 2024-07-05 | End: 2024-07-08

## 2024-07-05 RX ORDER — FAMOTIDINE 20 MG/1
20 TABLET, FILM COATED ORAL 2 TIMES DAILY
Qty: 60 TABLET | Refills: 0 | Status: SHIPPED | OUTPATIENT
Start: 2024-07-05

## 2024-07-05 RX ADMIN — FAMOTIDINE 20 MG: 10 INJECTION, SOLUTION INTRAVENOUS at 21:15

## 2024-07-05 RX ADMIN — HYDROXYZINE HYDROCHLORIDE 50 MG: 25 TABLET ORAL at 22:09

## 2024-07-05 ASSESSMENT — ENCOUNTER SYMPTOMS
DIARRHEA: 0
SHORTNESS OF BREATH: 0
VOMITING: 0
NAUSEA: 0
ABDOMINAL PAIN: 0

## 2024-07-05 ASSESSMENT — PAIN - FUNCTIONAL ASSESSMENT: PAIN_FUNCTIONAL_ASSESSMENT: NONE - DENIES PAIN

## 2024-07-05 ASSESSMENT — LIFESTYLE VARIABLES
HOW MANY STANDARD DRINKS CONTAINING ALCOHOL DO YOU HAVE ON A TYPICAL DAY: PATIENT DOES NOT DRINK
HOW OFTEN DO YOU HAVE A DRINK CONTAINING ALCOHOL: NEVER

## 2024-07-06 NOTE — DISCHARGE INSTRUCTIONS
It was a pleasure taking care of you in our Emergency Department today.  We know that when you come to [facility], you are entrusting us with your health, comfort, and safety.  Our physicians and nurses honor that trust, and truly appreciate the opportunity to care for you and your loved ones.      We also value your feedback.  If you receive a survey about your Emergency Department experience today, please fill it out.  We care about our patients' feedback, and we listen to what you have to say.  Thank you!      Dr. Rebecca Fabian MD.

## 2024-07-06 NOTE — ED PROVIDER NOTES
Discharge to home     Srinivas Rodriguez's  results have been reviewed with him.  He has been counseled regarding his diagnosis, treatment, and plan.  He verbally conveys understanding and agreement of the signs, symptoms, diagnosis, treatment and prognosis and additionally agrees to follow up as discussed.  He also agrees with the care-plan.  I believe that all of his questions have been answered.  I have also provided discharge instructions for him that include: educational information regarding their diagnosis and treatment, and list of reasons why they would want to return to the ED prior to their follow-up appointment, should his condition change.     PATIENT REFERRED TO:  Fredrick Parsons MD  [address] [facility] [address] [phone]    Schedule an appointment as soon as possible for a visit in 2 days      [facility] EMERGENCY DEPT  [address] [facility] [address] [phone]    If symptoms worsen        DISCHARGE MEDICATIONS:     Medication List        START taking these medications      famotidine 20 MG tablet  Commonly known as: Pepcid  Take 1 tablet by mouth 2 times daily     hydrOXYzine HCl 25 MG tablet  Commonly known as: ATARAX  Take 1 tablet by mouth every 6 hours as needed for Itching     predniSONE 10 MG tablet  Commonly known as: DELTASONE  Take 3 tablets by mouth daily for 3 days            ASK your doctor about these medications      busPIRone 5 MG tablet  Commonly known as: BUSPAR  TAKE 1 TABLET BY MOUTH TWICE A DAY     donepezil 10 MG tablet  Commonly known as: ARICEPT  TAKE 1 TABLET BY MOUTH EVERY DAY     escitalopram 20 MG tablet  Commonly known as: LEXAPRO  Take 1 tablet by mouth daily     memantine 10 MG tablet  Commonly known as: NAMENDA  1 tab BID     pregabalin 200 MG capsule  Commonly known as: LYRICA               Where to Get Your Medications        These medications were sent to [facility]/pharmacy #[id number] - [place], [place] - [address]

## 2024-07-06 NOTE — ED NOTES
Patient discharged from the ED by MD Caren. Diagnosis, medications, precautions and follow-ups were reviewed with the patient/family. Questions were asked and answered prior to departure. Patient departed the ED via wheelchair and was accompanied by family to car.

## 2024-07-09 ENCOUNTER — TELEPHONE (OUTPATIENT)
Age: 69
End: 2024-07-09
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
## 2024-07-11 ENCOUNTER — TELEPHONE (OUTPATIENT)
Age: 69
End: 2024-07-11
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
## 2024-07-11 NOTE — TELEPHONE ENCOUNTER
Sent message not to drive to pt thru MyChart and mailed letter to pt, and DMV.    Confirmation was received.

## 2024-07-15 RX ORDER — DONEPEZIL HYDROCHLORIDE 10 MG/1
TABLET, FILM COATED ORAL
Qty: 90 TABLET | Refills: 3 | Status: SHIPPED | OUTPATIENT
Start: 2024-07-15

## 2024-10-09 DIAGNOSIS — F03.911 AGITATION DUE TO DEMENTIA (HCC): ICD-10-CM

## 2024-10-09 RX ORDER — ESCITALOPRAM OXALATE 20 MG/1
20 TABLET ORAL DAILY
Qty: 90 TABLET | Refills: 3 | Status: SHIPPED | OUTPATIENT
Start: 2024-10-09

## 2024-11-21 ENCOUNTER — OFFICE VISIT (OUTPATIENT)
Age: 69
End: 2024-11-21
Payer: COMMERCIAL

## 2024-11-21 VITALS
WEIGHT: 186.4 LBS | RESPIRATION RATE: 16 BRPM | HEART RATE: 49 BPM | TEMPERATURE: 97.6 F | DIASTOLIC BLOOD PRESSURE: 60 MMHG | HEIGHT: 66 IN | BODY MASS INDEX: 29.96 KG/M2 | OXYGEN SATURATION: 97 % | SYSTOLIC BLOOD PRESSURE: 132 MMHG

## 2024-11-21 DIAGNOSIS — G30.0 ALZHEIMER'S DISEASE WITH EARLY ONSET (CODE) (HCC): Primary | ICD-10-CM

## 2024-11-21 DIAGNOSIS — F03.911 AGITATION DUE TO DEMENTIA (HCC): ICD-10-CM

## 2024-11-21 PROCEDURE — 3017F COLORECTAL CA SCREEN DOC REV: CPT | Performed by: PSYCHIATRY & NEUROLOGY

## 2024-11-21 PROCEDURE — 1036F TOBACCO NON-USER: CPT | Performed by: PSYCHIATRY & NEUROLOGY

## 2024-11-21 PROCEDURE — G8427 DOCREV CUR MEDS BY ELIG CLIN: HCPCS | Performed by: PSYCHIATRY & NEUROLOGY

## 2024-11-21 PROCEDURE — G8417 CALC BMI ABV UP PARAM F/U: HCPCS | Performed by: PSYCHIATRY & NEUROLOGY

## 2024-11-21 PROCEDURE — G8484 FLU IMMUNIZE NO ADMIN: HCPCS | Performed by: PSYCHIATRY & NEUROLOGY

## 2024-11-21 PROCEDURE — 1123F ACP DISCUSS/DSCN MKR DOCD: CPT | Performed by: PSYCHIATRY & NEUROLOGY

## 2024-11-21 PROCEDURE — 99214 OFFICE O/P EST MOD 30 MIN: CPT | Performed by: PSYCHIATRY & NEUROLOGY

## 2024-11-21 RX ORDER — BUSPIRONE HYDROCHLORIDE 10 MG/1
10 TABLET ORAL 2 TIMES DAILY
Qty: 180 TABLET | Refills: 3 | Status: SHIPPED | OUTPATIENT
Start: 2024-11-21 | End: 2025-11-16

## 2024-11-21 ASSESSMENT — PATIENT HEALTH QUESTIONNAIRE - PHQ9
SUM OF ALL RESPONSES TO PHQ QUESTIONS 1-9: 0
1. LITTLE INTEREST OR PLEASURE IN DOING THINGS: NOT AT ALL
SUM OF ALL RESPONSES TO PHQ QUESTIONS 1-9: 0
SUM OF ALL RESPONSES TO PHQ9 QUESTIONS 1 & 2: 0
2. FEELING DOWN, DEPRESSED OR HOPELESS: NOT AT ALL

## 2024-11-21 NOTE — ASSESSMENT & PLAN NOTE
Patient never got neuropsych testing completed in May 2024.  That appointment was canceled by his wife because patient did not want to go through with it.    PET amyloid was also not completed previously and he does not wish to get this completed    He is to continue on donepezil 10 mg in the morning [if he takes it at night he has nightmares as well as Namenda 10 mg twice daily    He is not interested in pursuing any further treatment including our newer treatments of monoclonal antibody infusions

## 2024-11-21 NOTE — PATIENT INSTRUCTIONS
As a reminder:   Please come to your appointment 15 minutes before your office appointment.  This way, you can get checked in at the  and checked in by the nursing staff so you have the full allotment of time with your provider for your visit.  Please bring an up-to-date and accurate list of all your medications.  Or bring all your active prescription bottles with you at the time of your office visit and this includes over-the-counter medications so we can make sure that your medication list is up-to-date.  If you are scheduled for a virtual visit, please be aware that the  will need to check you in and usually the day before to verify insurance and collect co-pays as appropriate.  Please be prepared for the second call which will be from the nurse to go over your medications and any other vital information.  This will probably be done 30 minutes prior to your visit.  The reason why we do this early is that you can get the full benefit of your appointment time with your provider.  Finally you will be given the link for your virtual visit please click into your link 10 minutes prior to your appointment and please wait patiently for the provider to join you        As per discussion  Patient Information:  Name: Srinivas Gross  Age: 69  Medical History: Cognitive impairment, severe depression, anaphylactic reactions    Reason for Visit:  Srinivas visited the clinic to evaluate his cognitive issues  His wife reported a decline in his cognitive function, increased forgetfulness, severe depression, and anger. Srinivas has also experienced monthly anaphylactic reactions.    Clinical Findings:  - Cognitive function remains stable but with increased forgetfulness and functional issues such as leaving water running etc  - Severe depression and anger, with Srinivas spending extended periods in a recliner and occasionally sleeping all day.  - Monthly anaphylactic reactions, with the most recent episode involving a

## 2024-11-21 NOTE — PROGRESS NOTES
[facility]  [facility]  [address]  [place] [address]  Phone: [phone] fax: [phone]        Srinivas Rodriguez is a 69 y.o. male who presents today for the following:  Chief Complaint   Patient presents with    Follow-up     Followup on alzheimers and states that he has lost quite a bit this year.  2 episodes ended up with er visits.  Ended up with anaphylaxis.  Tested for all kinds of things but now has epipen.            ASSESSMENT AND PLAN  1. Alzheimer's disease with early onset (CODE) ([facility])  Assessment & Plan:  Patient never got neuropsych testing completed in May 2024.  That appointment was canceled by his wife because patient did not want to go through with it.    PET amyloid was also not completed previously and he does not wish to get this completed    He is to continue on donepezil 10 mg in the morning [if he takes it at night he has nightmares as well as Namenda 10 mg twice daily    He is not interested in pursuing any further treatment including our newer treatments of monoclonal antibody infusions   2. 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 ([facility])  Assessment & Plan:  He continues on Lexapro 20 mg/day but he be still becomes easily agitated and we will increase the BuSpar to 10 mg twice a day.  We talked about behavioral interventions and coping mechanisms    I have also recommended possibly going care with psychiatrist for more appropriate management.  However it is doubtful that the patient will pursue this     I agree with keeping the firearms secured and locked in the house preferably would prefer them removed off the property  Orders:  -     busPIRone (BUSPAR) 10 MG tablet; Take 1 tablet by mouth 2 times daily, Disp-180 tablet, R-3Normal          Patient and/or family was given time to ask questions and voice concerns. I believe all questions concerns were adequately addressed at this  office visit.  Patient and/or family also verbalized

## 2024-11-21 NOTE — ASSESSMENT & PLAN NOTE
He continues on Lexapro 20 mg/day but he be still becomes easily agitated and we will increase the BuSpar to 10 mg twice a day.  We talked about behavioral interventions and coping mechanisms    I have also recommended possibly going care with psychiatrist for more appropriate management.  However it is doubtful that the patient will pursue this     I agree with keeping the firearms secured and locked in the house preferably would prefer them removed off the property

## 2024-12-09 DIAGNOSIS — G30.0 ALZHEIMER'S DISEASE WITH EARLY ONSET (CODE) (HCC): ICD-10-CM

## 2024-12-09 RX ORDER — MEMANTINE HYDROCHLORIDE 10 MG/1
TABLET ORAL
Qty: 180 TABLET | Refills: 3 | Status: SHIPPED | OUTPATIENT
Start: 2024-12-09

## 2025-01-01 ENCOUNTER — HOSPITAL ENCOUNTER (EMERGENCY)
Facility: HOSPITAL | Age: 70
End: 2025-02-19
Attending: EMERGENCY MEDICINE
Payer: COMMERCIAL

## 2025-01-01 DIAGNOSIS — I46.9 CARDIAC ARREST (HCC): Primary | ICD-10-CM

## 2025-01-01 LAB
ANION GAP BLD CALC-SCNC: ABNORMAL (ref 10–20)
CA-I BLD-MCNC: 1.3 MMOL/L (ref 1.15–1.33)
CHLORIDE BLD-SCNC: 104 MMOL/L (ref 100–111)
CREAT UR-MCNC: 1.1 MG/DL (ref 0.6–1.3)
GLUCOSE BLD STRIP.AUTO-MCNC: 337 MG/DL (ref 74–99)
LACTATE BLD-SCNC: 13.05 MMOL/L (ref 0.4–2)
PCO2 BLDV: >110 MMHG (ref 41–51)
PH BLDV: 6.86 (ref 7.32–7.42)
PO2 BLDV: <27 MMHG (ref 25–40)
POTASSIUM BLD-SCNC: 3.7 MMOL/L (ref 3.5–5.5)
SERVICE CMNT-IMP: ABNORMAL
SODIUM BLD-SCNC: 149 MMOL/L (ref 136–145)
SPECIMEN SITE: ABNORMAL

## 2025-01-01 PROCEDURE — 84295 ASSAY OF SERUM SODIUM: CPT

## 2025-01-01 PROCEDURE — 84132 ASSAY OF SERUM POTASSIUM: CPT

## 2025-01-01 PROCEDURE — 82330 ASSAY OF CALCIUM: CPT

## 2025-01-01 PROCEDURE — 82803 BLOOD GASES ANY COMBINATION: CPT

## 2025-01-01 PROCEDURE — 2500000003 HC RX 250 WO HCPCS: Performed by: EMERGENCY MEDICINE

## 2025-01-01 PROCEDURE — 92950 HEART/LUNG RESUSCITATION CPR: CPT

## 2025-01-01 PROCEDURE — 99285 EMERGENCY DEPT VISIT HI MDM: CPT

## 2025-01-01 PROCEDURE — 82947 ASSAY GLUCOSE BLOOD QUANT: CPT

## 2025-01-01 PROCEDURE — 6360000002 HC RX W HCPCS: Performed by: EMERGENCY MEDICINE

## 2025-01-01 RX ORDER — EPINEPHRINE 1 MG/ML(1)
AMPUL (ML) INJECTION
Status: DISCONTINUED
Start: 2025-01-01 | End: 2025-01-01 | Stop reason: HOSPADM

## 2025-01-01 RX ORDER — EPINEPHRINE IN SOD CHLOR,ISO 1 MG/10 ML
SYRINGE (ML) INTRAVENOUS DAILY PRN
Status: COMPLETED | OUTPATIENT
Start: 2025-01-01 | End: 2025-01-01

## 2025-01-01 RX ADMIN — EPINEPHRINE 1 MG: 0.1 INJECTION, SOLUTION ENDOTRACHEAL; INTRACARDIAC; INTRAVENOUS at 13:58

## 2025-01-01 RX ADMIN — EPINEPHRINE 1 MG: 0.1 INJECTION, SOLUTION ENDOTRACHEAL; INTRACARDIAC; INTRAVENOUS at 14:02

## 2025-01-01 RX ADMIN — EPINEPHRINE 1 MG: 0.1 INJECTION, SOLUTION ENDOTRACHEAL; INTRACARDIAC; INTRAVENOUS at 14:04

## 2025-01-01 RX ADMIN — SODIUM BICARBONATE 50 MEQ: 84 INJECTION, SOLUTION INTRAVENOUS at 13:58

## 2025-02-09 ENCOUNTER — HOSPITAL ENCOUNTER (EMERGENCY)
Facility: HOSPITAL | Age: 70
Discharge: HOME | End: 2025-02-10
Attending: STUDENT IN AN ORGANIZED HEALTH CARE EDUCATION/TRAINING PROGRAM
Payer: COMMERCIAL

## 2025-02-09 DIAGNOSIS — T78.40XA ALLERGIC REACTION, INITIAL ENCOUNTER: Primary | ICD-10-CM

## 2025-02-09 LAB
ALBUMIN SERPL-MCNC: 3.6 G/DL (ref 3.5–5)
ALBUMIN/GLOB SERPL: 1.2 (ref 1.1–2.2)
ALP SERPL-CCNC: 78 U/L (ref 45–117)
ALT SERPL-CCNC: 22 U/L (ref 12–78)
ANION GAP SERPL CALC-SCNC: 10 MMOL/L (ref 2–12)
AST SERPL-CCNC: 18 U/L (ref 15–37)
BILIRUB SERPL-MCNC: 0.3 MG/DL (ref 0.2–1)
BUN SERPL-MCNC: 14 MG/DL (ref 6–20)
BUN/CREAT SERPL: 12 (ref 12–20)
CALCIUM SERPL-MCNC: 8.6 MG/DL (ref 8.5–10.1)
CHLORIDE SERPL-SCNC: 105 MMOL/L (ref 97–108)
CO2 SERPL-SCNC: 25 MMOL/L (ref 21–32)
CREAT SERPL-MCNC: 1.19 MG/DL (ref 0.7–1.3)
GLOBULIN SER CALC-MCNC: 3.1 G/DL (ref 2–4)
GLUCOSE SERPL-MCNC: 170 MG/DL (ref 65–100)
POTASSIUM SERPL-SCNC: 3.4 MMOL/L (ref 3.5–5.1)
PROT SERPL-MCNC: 6.7 G/DL (ref 6.4–8.2)
SODIUM SERPL-SCNC: 140 MMOL/L (ref 136–145)

## 2025-02-09 PROCEDURE — 93005 ELECTROCARDIOGRAM TRACING: CPT | Performed by: STUDENT IN AN ORGANIZED HEALTH CARE EDUCATION/TRAINING PROGRAM

## 2025-02-09 PROCEDURE — 80053 COMPREHEN METABOLIC PANEL: CPT

## 2025-02-09 PROCEDURE — 2500000003 HC RX 250 WO HCPCS: Performed by: STUDENT IN AN ORGANIZED HEALTH CARE EDUCATION/TRAINING PROGRAM

## 2025-02-09 PROCEDURE — 83520 IMMUNOASSAY QUANT NOS NONAB: CPT

## 2025-02-09 PROCEDURE — 2580000003 HC RX 258: Performed by: STUDENT IN AN ORGANIZED HEALTH CARE EDUCATION/TRAINING PROGRAM

## 2025-02-09 PROCEDURE — 84484 ASSAY OF TROPONIN QUANT: CPT

## 2025-02-09 PROCEDURE — 96374 THER/PROPH/DIAG INJ IV PUSH: CPT

## 2025-02-09 PROCEDURE — 85025 COMPLETE CBC W/AUTO DIFF WBC: CPT

## 2025-02-09 PROCEDURE — 36415 COLL VENOUS BLD VENIPUNCTURE: CPT

## 2025-02-09 PROCEDURE — 99284 EMERGENCY DEPT VISIT MOD MDM: CPT

## 2025-02-09 RX ADMIN — FAMOTIDINE 20 MG: 10 INJECTION, SOLUTION INTRAVENOUS at 23:27

## 2025-02-10 VITALS
HEART RATE: 54 BPM | SYSTOLIC BLOOD PRESSURE: 166 MMHG | RESPIRATION RATE: 13 BRPM | DIASTOLIC BLOOD PRESSURE: 104 MMHG | OXYGEN SATURATION: 98 % | TEMPERATURE: 98 F

## 2025-02-10 LAB
BASOPHILS # BLD: 0.08 K/UL (ref 0–0.1)
BASOPHILS NFR BLD: 0.9 % (ref 0–1)
DIFFERENTIAL METHOD BLD: ABNORMAL
EKG ATRIAL RATE: 54 BPM
EKG ATRIAL RATE: 64 BPM
EKG DIAGNOSIS: NORMAL
EKG DIAGNOSIS: NORMAL
EKG P AXIS: 64 DEGREES
EKG P AXIS: 69 DEGREES
EKG P-R INTERVAL: 144 MS
EKG P-R INTERVAL: 154 MS
EKG Q-T INTERVAL: 362 MS
EKG Q-T INTERVAL: 430 MS
EKG QRS DURATION: 88 MS
EKG QRS DURATION: 92 MS
EKG QTC CALCULATION (BAZETT): 373 MS
EKG QTC CALCULATION (BAZETT): 407 MS
EKG R AXIS: -15 DEGREES
EKG R AXIS: -30 DEGREES
EKG T AXIS: -14 DEGREES
EKG T AXIS: 33 DEGREES
EKG VENTRICULAR RATE: 54 BPM
EKG VENTRICULAR RATE: 64 BPM
EOSINOPHIL # BLD: 0.53 K/UL (ref 0–0.4)
EOSINOPHIL NFR BLD: 5.7 % (ref 0–7)
ERYTHROCYTE [DISTWIDTH] IN BLOOD BY AUTOMATED COUNT: 13.2 % (ref 11.5–14.5)
HCT VFR BLD AUTO: 46.8 % (ref 36.6–50.3)
HGB BLD-MCNC: 15.8 G/DL (ref 12.1–17)
IMM GRANULOCYTES # BLD AUTO: 0.05 K/UL (ref 0–0.04)
IMM GRANULOCYTES NFR BLD AUTO: 0.5 % (ref 0–0.5)
LYMPHOCYTES # BLD: 4.04 K/UL (ref 0.8–3.5)
LYMPHOCYTES NFR BLD: 43.3 % (ref 12–49)
MCH RBC QN AUTO: 30.2 PG (ref 26–34)
MCHC RBC AUTO-ENTMCNC: 33.8 G/DL (ref 30–36.5)
MCV RBC AUTO: 89.5 FL (ref 80–99)
MONOCYTES # BLD: 0.71 K/UL (ref 0–1)
MONOCYTES NFR BLD: 7.6 % (ref 5–13)
NEUTS SEG # BLD: 3.92 K/UL (ref 1.8–8)
NEUTS SEG NFR BLD: 42 % (ref 32–75)
NRBC # BLD: 0 K/UL (ref 0–0.01)
NRBC BLD-RTO: 0 PER 100 WBC
PLATELET # BLD AUTO: 238 K/UL (ref 150–400)
PMV BLD AUTO: 11 FL (ref 8.9–12.9)
RBC # BLD AUTO: 5.23 M/UL (ref 4.1–5.7)
TROPONIN I SERPL HS-MCNC: 154 NG/L (ref 0–76)
TROPONIN I SERPL HS-MCNC: 155 NG/L (ref 0–76)
WBC # BLD AUTO: 9.3 K/UL (ref 4.1–11.1)

## 2025-02-10 PROCEDURE — 6370000000 HC RX 637 (ALT 250 FOR IP): Performed by: STUDENT IN AN ORGANIZED HEALTH CARE EDUCATION/TRAINING PROGRAM

## 2025-02-10 PROCEDURE — 84484 ASSAY OF TROPONIN QUANT: CPT

## 2025-02-10 PROCEDURE — 93005 ELECTROCARDIOGRAM TRACING: CPT | Performed by: STUDENT IN AN ORGANIZED HEALTH CARE EDUCATION/TRAINING PROGRAM

## 2025-02-10 PROCEDURE — 36415 COLL VENOUS BLD VENIPUNCTURE: CPT

## 2025-02-10 RX ORDER — EPINEPHRINE 0.3 MG/.3ML
INJECTION SUBCUTANEOUS
Qty: 1 EACH | Refills: 0 | Status: SHIPPED | OUTPATIENT
Start: 2025-02-10

## 2025-02-10 RX ADMIN — LIDOCAINE HYDROCHLORIDE 40 ML: 20 SOLUTION ORAL at 03:04

## 2025-02-10 NOTE — ED PROVIDER NOTES
- 0.5 %    Neutrophils Absolute 3.92 1.80 - 8.00 K/UL    Lymphocytes Absolute 4.04 (H) 0.80 - 3.50 K/UL    Monocytes Absolute 0.71 0.00 - 1.00 K/UL    Eosinophils Absolute 0.53 (H) 0.00 - 0.40 K/UL    Basophils Absolute 0.08 0.00 - 0.10 K/UL    Immature Granulocytes Absolute 0.05 (H) 0.00 - 0.04 K/UL    Differential Type AUTOMATED     Troponin    Collection Time: 02/09/25 11:17 PM   Result Value Ref Range    Troponin, High Sensitivity 154 (HH) 0 - 76 ng/L   Troponin    Collection Time: 02/10/25  1:38 AM   Result Value Ref Range    Troponin, High Sensitivity 155 (HH) 0 - 76 ng/L   EKG 12 Lead (Abn HR)    Collection Time: 02/10/25  2:57 AM   Result Value Ref Range    Ventricular Rate 54 BPM    Atrial Rate 54 BPM    P-R Interval 144 ms    QRS Duration 88 ms    Q-T Interval 430 ms    QTc Calculation (Bazett) 407 ms    P Axis 69 degrees    R Axis -15 degrees    T Axis -14 degrees    Diagnosis       Sinus bradycardia  Nonspecific T wave abnormality  Abnormal ECG  When compared with ECG of 09-FEB-2025 23:05,  MANUAL COMPARISON REQUIRED, DATA IS UNCONFIRMED         EKG: If performed, independent interpretation documented below in the MDM section     RADIOLOGY:  Non-plain film images such as CT, Ultrasound and MRI are read by the radiologist. Plain radiographic images are visualized and preliminarily interpreted by the ED Provider with the findings documented in the MDM section.     Interpretation per the Radiologist below, if available at the time of this note:     No orders to display        PROCEDURES   Unless otherwise noted below, none  Procedures     CRITICAL CARE TIME   none    EMERGENCY DEPARTMENT COURSE and DIFFERENTIAL DIAGNOSIS/MDM   Vitals:    Vitals:    02/10/25 0213 02/10/25 0228 02/10/25 0243 02/10/25 0258   BP: (!) 159/92 (!) 169/82 (!) 151/78 (!) 166/104   Pulse: 54 54 55 54   Resp: 11 11 14 13   Temp:       SpO2: 98% 98% 98% 98%        Patient was given the following medications:  Medications   famotidine

## 2025-02-10 NOTE — ED NOTES
Patient states he feels much better. His skin is not as red as when he came in, and he does not appear to be in distress any longer.

## 2025-02-11 LAB — TRYPTASE SERPL-MCNC: 15 UG/L (ref 2.2–13.2)

## 2025-02-19 NOTE — ED NOTES
Mr. Rodriguez arrived via EMS with CPR in progress. EMS was called to his house for a fall and unresponsive patient. Patient was at home with his wife, when she heard him fall in another room. EMS gave 4 rounds of Epi and administered 1 shock prior to arrival. The hospital team continued CPR, see documentation in code narrator. At time of this note, patient has been cleaned, placed in a clean gown, and all lines removed. The patient's family to include his wife and daughter are at the bedside, with sigrid Marie present.

## 2025-02-19 NOTE — PROGRESS NOTES
Spiritual Health History and Assessment/Progress Note  [facility]    Grief, Loss, and Adjustments,  , Death,      Name: Srinivas Rodriguez MRN: [id number]    Age: 69 y.o.     Sex: male   Language: English   [employer]aritan: Orthodoxy   <principal problem not specified>     Date: 2/19/2025            Total Time Calculated: 44 min              Spiritual Assessment began in [facility] EMERGENCY DEPARTMENT        Referral/Consult From: Multi-disciplinary team   Encounter Overview/Reason: Grief, Loss, and Adjustments  Service Provided For: Family    Hui, Belief, Meaning:   Patient unable to assess at this time  Family/Friends have beliefs or practices that help with coping during difficult times      Importance and Influence:  Patient unable to assess at this time  Family/Friends have spiritual/personal beliefs that influence decisions regarding the patient's health    Community:  Patient Other:    Family/Friends feel well-supported. Support system includes: Children    Assessment and Plan of Care:     Patient Interventions include: Other:    Family/Friends Interventions include: Affirmed coping skills/support systems    Patient Plan of Care: No future visits per patient/family request  Family/Friends Plan of Care: No future visits per patient/family request    Electronically signed by OMAYRA Mendoza on 2/19/2025 at 3:00 PM

## 2025-02-19 NOTE — ED NOTES
PAGED ON CALL CHARLA RETURNED THE CALL, ADVISED TO CALL BACK WHEN FAMILY ARRIVES AND HE WILL COME DOWN.